# Patient Record
Sex: MALE | Race: OTHER | Employment: FULL TIME | URBAN - METROPOLITAN AREA
[De-identification: names, ages, dates, MRNs, and addresses within clinical notes are randomized per-mention and may not be internally consistent; named-entity substitution may affect disease eponyms.]

---

## 2017-04-28 ENCOUNTER — HOSPITAL ENCOUNTER (EMERGENCY)
Age: 37
Discharge: HOME | End: 2017-04-28
Attending: EMERGENCY MEDICINE
Payer: SUBSIDIZED

## 2017-04-28 VITALS
HEART RATE: 73 BPM | SYSTOLIC BLOOD PRESSURE: 121 MMHG | DIASTOLIC BLOOD PRESSURE: 67 MMHG | RESPIRATION RATE: 16 BRPM | HEIGHT: 70 IN | OXYGEN SATURATION: 97 % | WEIGHT: 212 LBS | BODY MASS INDEX: 30.35 KG/M2 | TEMPERATURE: 97.5 F

## 2017-04-28 DIAGNOSIS — H01.001 BLEPHARITIS OF RIGHT UPPER EYELID, UNSPECIFIED TYPE: Primary | ICD-10-CM

## 2017-04-28 PROCEDURE — 99283 EMERGENCY DEPT VISIT LOW MDM: CPT | Performed by: PHYSICIAN ASSISTANT

## 2017-04-28 RX ORDER — CEPHALEXIN 500 MG/1
500 CAPSULE ORAL 4 TIMES DAILY
Qty: 28 CAP | Refills: 0 | Status: SHIPPED | OUTPATIENT
Start: 2017-04-28 | End: 2017-05-05

## 2017-04-28 NOTE — ED TRIAGE NOTES
Pt c/o eye pain to the right eye x2 days. Pt denies trauma/injury and no drainage, crust or tearing.

## 2017-04-28 NOTE — ED NOTES
I have reviewed discharge instructions with the patient. The patient verbalized understanding. Patient is ambulatory from facility in no acute distress. Patient has discharge instructions and prescription x 1 in hand at time of departure.

## 2017-04-28 NOTE — ED TRIAGE NOTES
Pt has been using ciprofloxacin drops prescribed to his daughter x2 days to help alleviate symptoms. No relief.

## 2017-04-28 NOTE — DISCHARGE INSTRUCTIONS
Blepharitis: Care Instructions  Your Care Instructions    Blepharitis is an inflammation or infection of the eyelids. It causes dry, scaly crusts on the eyelids. It can also cause your eyes to itch, burn, and look red. This problem is more common in people who have rosacea, dandruff, skin allergies, or eczema. Home treatment can help you keep your eyes comfortable. Your doctor may also prescribe an ointment to put on your eyelids. Follow-up care is a key part of your treatment and safety. Be sure to make and go to all appointments, and call your doctor if you are having problems. It's also a good idea to know your test results and keep a list of the medicines you take. How can you care for yourself at home? · Wash your eyelids and eyebrows daily with baby shampoo. To wash your eyelids:  ¨ Place a very warm washcloth over your eyes for about a minute. This will help soften and loosen the crusts on your eyelashes. ¨ Put a few drops of baby shampoo on a warm washcloth. ¨ Gently wipe your eyelids. This helps remove any crust. It also cleans your eyelids. ¨ Rinse well with water. · Be safe with medicines. If your doctor prescribed medicine for you, use it exactly as directed. Call your doctor if you think you are having a problem with your medicine. When should you call for help? Call your doctor now or seek immediate medical care if:  · You have new vision problems. · Your eyes hurt. · Your eyelids bleed. Watch closely for changes in your health, and be sure to contact your doctor if:  · After 2 weeks of home treatment, your symptoms are not getting better. · Your eye turns red, gets very teary, or has discharge. Where can you learn more? Go to http://ghulam-karma.info/. Enter W102 in the search box to learn more about \"Blepharitis: Care Instructions. \"  Current as of: May 23, 2016  Content Version: 11.2  © 1100-9527 [employer] ([employer].  Care instructions adapted under license by [address] (which disclaims liability or warranty for this information). If you have questions about a medical condition or this instruction, always ask your healthcare professional. [address] any warranty or liability for your use of this information.

## 2017-04-28 NOTE — ED PROVIDER NOTES
Patient is a 39 y.o. male presenting with eye pain. The history is provided by the patient. Eye Pain    This is a new problem. The current episode started more than 2 days ago. The problem occurs constantly. The problem has been gradually worsening. The right eye is affected. The injury mechanism was none. The pain is at a severity of 6/10. The pain is mild. There is no history of trauma to the eye. There is no known exposure to pink eye. He does not wear contacts. Associated symptoms include pain. Pertinent negatives include no decreased vision, no itching and no blindness. Associated symptoms comments: Lid swelling . Treatments tried: cipro drops  The treatment provided no relief. History reviewed. No pertinent past medical history. Past Surgical History:   Procedure Laterality Date    HX ORTHOPAEDIC      left thumb surgery         History reviewed. No pertinent family history. Social History     Social History    Marital status: SINGLE     Spouse name: N/A    Number of children: N/A    Years of education: N/A     Occupational History    Not on file. Social History Main Topics    Smoking status: Never Smoker    Smokeless tobacco: Not on file    Alcohol use Yes      Comment: 1x/week maybe    Drug use: No    Sexual activity: Not on file     Other Topics Concern    Not on file     Social History Narrative    No narrative on file         ALLERGIES: Review of patient's allergies indicates no known allergies. Review of Systems   Eyes: Positive for pain. Negative for blindness. Skin: Negative for itching. All other systems reviewed and are negative. Vitals:    04/28/17 0929   BP: 121/67   Pulse: 73   Resp: 16   Temp: 97.5 °F (36.4 °C)   SpO2: 97%   Weight: 96.2 kg (212 lb)   Height: 5' 10\" (1.778 m)            Physical Exam   Constitutional: He is oriented to person, place, and time. He appears well-developed and well-nourished. No distress.    HENT:   Head: Normocephalic and atraumatic. Eyes: Conjunctivae and EOM are normal. Pupils are equal, round, and reactive to light. Rt upper eye lid with redness and swelling, no drianage, sclera clear    Neck: Normal range of motion. Neck supple. Cardiovascular: Normal rate and regular rhythm. Pulmonary/Chest: Effort normal and breath sounds normal. No respiratory distress. He has no wheezes. Abdominal: Soft. Bowel sounds are normal.   Musculoskeletal: He exhibits no edema. Neurological: He is alert and oriented to person, place, and time. Skin: Skin is warm. Nursing note and vitals reviewed.        MDM  Number of Diagnoses or Management Options  Diagnosis management comments: Blepharitis, stressed warm compresses, placed on keflex        Amount and/or Complexity of Data Reviewed  Review and summarize past medical records: yes    Risk of Complications, Morbidity, and/or Mortality  Presenting problems: low  Diagnostic procedures: low  Management options: low    Patient Progress  Patient progress: improved    ED Course       Procedures

## 2022-01-06 ENCOUNTER — APPOINTMENT (OUTPATIENT)
Dept: GENERAL RADIOLOGY | Age: 42
End: 2022-01-06
Attending: EMERGENCY MEDICINE
Payer: COMMERCIAL

## 2022-01-06 ENCOUNTER — HOSPITAL ENCOUNTER (EMERGENCY)
Age: 42
Discharge: HOME | End: 2022-01-06
Attending: EMERGENCY MEDICINE
Payer: COMMERCIAL

## 2022-01-06 VITALS
HEIGHT: 70 IN | RESPIRATION RATE: 16 BRPM | HEART RATE: 73 BPM | BODY MASS INDEX: 30.78 KG/M2 | WEIGHT: 215 LBS | DIASTOLIC BLOOD PRESSURE: 78 MMHG | SYSTOLIC BLOOD PRESSURE: 119 MMHG | OXYGEN SATURATION: 97 % | TEMPERATURE: 97.9 F

## 2022-01-06 DIAGNOSIS — S83.92XA SPRAIN OF LEFT KNEE, UNSPECIFIED LIGAMENT, INITIAL ENCOUNTER: Primary | ICD-10-CM

## 2022-01-06 PROCEDURE — 99283 EMERGENCY DEPT VISIT LOW MDM: CPT

## 2022-01-06 PROCEDURE — 73562 X-RAY EXAM OF KNEE 3: CPT

## 2022-01-06 PROCEDURE — 73590 X-RAY EXAM OF LOWER LEG: CPT

## 2022-01-06 RX ORDER — IBUPROFEN 800 MG/1
800 TABLET ORAL
Qty: 20 TABLET | Refills: 0 | Status: SHIPPED | OUTPATIENT
Start: 2022-01-06 | End: 2022-01-13

## 2022-01-06 NOTE — Clinical Note
[address] [address] EMERGENCY DEPT  [address] [address]  [phone]    Work/School Note    Date: 1/6/2022    To Whom It May concern:      Sreedhar Crews was seen and treated today in the emergency room by the following provider(s):  Attending Provider: Elton Huang MD  Physician Assistant: Mara Waite, [address] [place], [place] [address]annemarie Smith. Sreedhar Crews is excused from work/school on 01/06/22. He is clear to return to work/school on 01/07/22.         Sincerely,          Candie Gomez RN denies pain/discomfort

## 2022-01-06 NOTE — ED TRIAGE NOTES
Patient is a  and his foot went between back of truck and dock falling complaining of pain to left knee. Patient with abrasion to front of left lower leg. Denies any further pain. Masked.

## 2022-01-06 NOTE — DISCHARGE INSTRUCTIONS
Your x-ray today showed no fracture. You likely sprained one of the ligaments in your knee. Wear the knee immobilizer when ambulating over the next 2 to 3 days to help with pain and swelling. Elevate the leg when resting. Use ice to help with swelling. Take ibuprofen as prescribed for inflammation and pain control. Follow-up with your primary care doctor or call [address], number provided above, if no improvement in your symptoms over the next week. Return to the ER if you develop any new or worsening symptoms.

## 2022-01-06 NOTE — ED PROVIDER NOTES
40-year-old well-appearing male presents today for evaluation of left knee and anterior shin injury that occurred last night while at work. 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 He states that he had a twisting motion when he fell and has had pain in the medial aspect of the left knee ever since. Reports mild associated swelling. He also reports a very superficial abrasion of the left anterior shin. No numbness or tingling distally. No hip pain. He is able to ambulate without assistance. He denies any previous injury or surgery to this left knee. He states that his pain has progressively worsened overnight which is what prompted his visit to the department today. No past medical history on file. Past Surgical History:   Procedure Laterality Date    HX ORTHOPAEDIC      left thumb surgery         No family history on file. Social History     Socioeconomic History    Marital status: SINGLE     Spouse name: Not on file    Number of children: Not on file    Years of education: Not on file    Highest education level: Not on file   Occupational History    Not on file   Tobacco Use    Smoking status: Never Smoker    Smokeless tobacco: Not on file   Substance and Sexual Activity    Alcohol use: Yes     Comment: 1x/week maybe    Drug use: No    Sexual activity: Not on file   Other Topics Concern    Not on file   Social History Narrative    Not on file     Social Determinants of Health     Financial Resource Strain:     Difficulty of Paying Living Expenses: Not on file   Food Insecurity:     Worried About Running Out of Food in the Last Year: Not on file    Latricia of Food in the Last Year: Not on file   Transportation Needs:     Lack of Transportation (Medical): Not on file    Lack of Transportation (Non-Medical):  Not on file   Physical Activity:     Days of Exercise per Week: Not on file    Minutes of Exercise per Session: Not on file   Stress:     Feeling of Stress : Not on file   Social Connections:     Frequency of Communication with Friends and Family: Not on file    Frequency of Social Gatherings with Friends and Family: Not on file    Attends Church Services: Not on file    Active Member of Clubs or Organizations: Not on file    Attends Club or Organization Meetings: Not on file    Marital Status: Not on file   Intimate Partner Violence:     Fear of Current or Ex-Partner: Not on file    Emotionally Abused: Not on file    Physically Abused: Not on file    Sexually Abused: Not on file   Housing Stability:     Unable to Pay for Housing in the Last Year: Not on file    Number of [place] in the Last Year: Not on file    Unstable Housing in the Last Year: Not on file         ALLERGIES: Patient has no known allergies. Review of Systems   Musculoskeletal: Positive for joint swelling. Negative for gait problem. Left knee pain   Skin: Positive for wound. Abrasion of left shin   Neurological: Negative for weakness. No paresthesias. Denies head injury. Hematological: Does not bruise/bleed easily. Vitals:    01/06/22 0832   BP: 130/84   Pulse: 91   Resp: 16   Temp: 97.9 °F (36.6 °C)   SpO2: 95%   Weight: 97.5 kg (215 lb)   Height: 5' 10\" (1.778 m)            Physical Exam  Vitals and nursing note reviewed. Constitutional:       General: He is not in acute distress. Appearance: Normal appearance. HENT:      Head: Normocephalic and atraumatic. Musculoskeletal:      Left knee: Swelling, bony tenderness and crepitus present. No deformity, effusion, erythema, ecchymosis or lacerations. Normal range of motion. No LCL laxity, MCL laxity, ACL laxity or PCL laxity. Instability Tests: Medial Essence test negative and lateral Essence test negative. Legs:    Skin:     General: Skin is warm and dry.       Capillary Refill: Capillary refill takes less than 2 seconds. Neurological:      General: No focal deficit present. Mental Status: He is alert and oriented to person, place, and time. Psychiatric:         Mood and Affect: Mood normal.         Speech: Speech normal.         Behavior: Behavior normal.          MDM  Number of Diagnoses or Management Options  Sprain of left knee, unspecified ligament, initial encounter  Diagnosis management comments: DDX: Fracture, sprain, strain, ligament tear, effusion, dislocation, laceration, compartment syndrome    In summary this is a well-appearing 62-year-old male presenting today for left knee twisting injury while at work last night. On physical exam, there is very mild swelling and tenderness to palpation over the medial aspect of the left knee. There is no erythema, warmth, or ecchymosis. Is a very mild superficial abrasion of the anterior shin with mild surrounding erythema and swelling. He is neurovascularly intact distally, compartments are soft. X-rays are negative for acute fracture. Patient will be treated outpatient with knee immobilizer, anti-inflammatories, and educated on RICE. He will follow up with PCP or with Ortho if no improvement in symptoms over the next 1 week. He was educated on signs symptoms that require emergent reevaluation and he verbalizes understanding and agrees with this plan. Kareen Oliver; 1/6/2022 @9:56 AM Voice dictation software was used during the making of this note. This software is not perfect and grammatical and other typographical errors may be present.   This note has not been proofread for errors.  ====================================         Amount and/or Complexity of Data Reviewed  Tests in the radiology section of CPT®: ordered and reviewed    Patient Progress  Patient progress: stable         Procedures

## 2022-01-06 NOTE — ED NOTES
I have reviewed discharge instructions with the patient. The patient verbalized understanding. Patient left ED via Discharge Method: ambulatory to Home with self. Opportunity for questions and clarification provided. Patient given 1 scripts. To continue your aftercare when you leave the hospital, you may receive an automated call from our care team to check in on how you are doing. This is a free service and part of our promise to provide the best care and service to meet your aftercare needs.  If you have questions, or wish to unsubscribe from this service please call [phone]. Thank you for Choosing our [facility] Emergency Department.

## 2022-01-06 NOTE — Clinical Note
[address] [address] EMERGENCY DEPT  [address] [facility] [address]  [phone]    Work/School Note    Date: 1/6/2022    To Whom It May concern:      Alberto Guan was seen and treated today in the emergency room by the following provider(s):  Attending Provider: Percy Dolan MD  Physician Assistant: Kareen Lei. Alberto Guan is excused from work/school on 01/06/22. He is clear to return to work/school on 01/07/22.         Sincerely,          Ebonie Mckeonma

## 2022-08-19 ENCOUNTER — HOSPITAL ENCOUNTER (EMERGENCY)
Age: 42
Discharge: HOME | End: 2022-08-19
Attending: EMERGENCY MEDICINE
Payer: COMMERCIAL

## 2022-08-19 VITALS
TEMPERATURE: 98.7 F | RESPIRATION RATE: 19 BRPM | OXYGEN SATURATION: 99 % | HEART RATE: 71 BPM | BODY MASS INDEX: 33.64 KG/M2 | SYSTOLIC BLOOD PRESSURE: 119 MMHG | DIASTOLIC BLOOD PRESSURE: 79 MMHG | WEIGHT: 235 LBS | HEIGHT: 70 IN

## 2022-08-19 DIAGNOSIS — U07.1 COVID-19: Primary | ICD-10-CM

## 2022-08-19 LAB
ALBUMIN SERPL-MCNC: 3.6 G/DL (ref 3.5–5)
ALBUMIN/GLOB SERPL: 1 {RATIO} (ref 1.2–3.5)
ALP SERPL-CCNC: 76 U/L (ref 50–136)
ALT SERPL-CCNC: 151 U/L (ref 12–65)
ANION GAP SERPL CALC-SCNC: 2 MMOL/L (ref 7–16)
AST SERPL-CCNC: 246 U/L (ref 15–37)
BASOPHILS # BLD: 0 K/UL (ref 0–0.2)
BASOPHILS NFR BLD: 0 % (ref 0–2)
BILIRUB SERPL-MCNC: 0.3 MG/DL (ref 0.2–1.1)
BUN SERPL-MCNC: 14 MG/DL (ref 6–23)
CALCIUM SERPL-MCNC: 8.3 MG/DL (ref 8.3–10.4)
CHLORIDE SERPL-SCNC: 103 MMOL/L (ref 98–107)
CO2 SERPL-SCNC: 32 MMOL/L (ref 21–32)
CREAT SERPL-MCNC: 1.2 MG/DL (ref 0.8–1.5)
DIFFERENTIAL METHOD BLD: ABNORMAL
EOSINOPHIL # BLD: 0 K/UL (ref 0–0.8)
EOSINOPHIL NFR BLD: 0 % (ref 0.5–7.8)
ERYTHROCYTE [DISTWIDTH] IN BLOOD BY AUTOMATED COUNT: 13.8 % (ref 11.9–14.6)
GLOBULIN SER CALC-MCNC: 3.7 G/DL (ref 2.3–3.5)
GLUCOSE SERPL-MCNC: 108 MG/DL (ref 65–100)
HCT VFR BLD AUTO: 43.5 % (ref 41.1–50.3)
HGB BLD-MCNC: 14.4 G/DL (ref 13.6–17.2)
IMM GRANULOCYTES # BLD AUTO: 0 K/UL (ref 0–0.5)
IMM GRANULOCYTES NFR BLD AUTO: 0 % (ref 0–5)
LYMPHOCYTES # BLD: 0.7 K/UL (ref 0.5–4.6)
LYMPHOCYTES NFR BLD: 22 % (ref 13–44)
MAGNESIUM SERPL-MCNC: 2.1 MG/DL (ref 1.8–2.4)
MCH RBC QN AUTO: 28.3 PG (ref 26.1–32.9)
MCHC RBC AUTO-ENTMCNC: 33.1 G/DL (ref 31.4–35)
MCV RBC AUTO: 85.6 FL (ref 79.6–97.8)
MONOCYTES # BLD: 0.4 K/UL (ref 0.1–1.3)
MONOCYTES NFR BLD: 13 % (ref 4–12)
NEUTS SEG # BLD: 2 K/UL (ref 1.7–8.2)
NEUTS SEG NFR BLD: 65 % (ref 43–78)
NRBC # BLD: 0 K/UL (ref 0–0.2)
PLATELET # BLD AUTO: 202 K/UL (ref 150–450)
PMV BLD AUTO: 9.9 FL (ref 9.4–12.3)
POTASSIUM SERPL-SCNC: 3.6 MMOL/L (ref 3.5–5.1)
PROT SERPL-MCNC: 7.3 G/DL (ref 6.3–8.2)
RBC # BLD AUTO: 5.08 M/UL (ref 4.23–5.6)
SARS-COV-2 RDRP RESP QL NAA+PROBE: DETECTED
SODIUM SERPL-SCNC: 137 MMOL/L (ref 136–145)
SOURCE: ABNORMAL
WBC # BLD AUTO: 3.1 K/UL (ref 4.3–11.1)

## 2022-08-19 PROCEDURE — 6360000002 HC RX W HCPCS: Performed by: PHYSICIAN ASSISTANT

## 2022-08-19 PROCEDURE — 83735 ASSAY OF MAGNESIUM: CPT

## 2022-08-19 PROCEDURE — 99284 EMERGENCY DEPT VISIT MOD MDM: CPT

## 2022-08-19 PROCEDURE — 80053 COMPREHEN METABOLIC PANEL: CPT

## 2022-08-19 PROCEDURE — 87635 SARS-COV-2 COVID-19 AMP PRB: CPT

## 2022-08-19 PROCEDURE — 85025 COMPLETE CBC W/AUTO DIFF WBC: CPT

## 2022-08-19 PROCEDURE — 96374 THER/PROPH/DIAG INJ IV PUSH: CPT

## 2022-08-19 RX ORDER — KETOROLAC TROMETHAMINE 30 MG/ML
30 INJECTION, SOLUTION INTRAMUSCULAR; INTRAVENOUS
Status: COMPLETED | OUTPATIENT
Start: 2022-08-19 | End: 2022-08-19

## 2022-08-19 RX ADMIN — KETOROLAC TROMETHAMINE 30 MG: 30 INJECTION, SOLUTION INTRAMUSCULAR at 18:02

## 2022-08-19 ASSESSMENT — ENCOUNTER SYMPTOMS
COUGH: 0
SORE THROAT: 0
DIARRHEA: 1
VOMITING: 1
ABDOMINAL PAIN: 0
SHORTNESS OF BREATH: 0
NAUSEA: 1
EYE REDNESS: 0
BACK PAIN: 1

## 2022-08-19 ASSESSMENT — PAIN SCALES - GENERAL
PAINLEVEL_OUTOF10: 5
PAINLEVEL_OUTOF10: 8

## 2022-08-19 ASSESSMENT — PAIN - FUNCTIONAL ASSESSMENT: PAIN_FUNCTIONAL_ASSESSMENT: 0-10

## 2022-08-19 NOTE — Clinical Note
Marisela Ortiz was seen and treated in our emergency department on 8/19/2022. COVID19 virus is suspected. Per the CDC guidelines we recommend home isolation until the following conditions are all met:    1. At least five days have passed since symptoms first appeared and/or had a close exposure,   2. After home isolation for five days, wearing a mask around others for the next five days,  3. At least 24 have passed since last fever without the use of fever-reducing medications and  4. Symptoms (eg cough, shortness of breath) have improved    If you have any questions or concerns, please don't hesitate to call.     He may return to work/school on 08/22/2022        Cathy Hinojosa

## 2022-08-19 NOTE — ED TRIAGE NOTES
Patient reports lower back pain, dizziness, nausea, and feeling hot after starting new job where he feels like he is \"overheating. \"

## 2022-08-19 NOTE — ED NOTES
I have reviewed discharge instructions with the patient. The patient verbalized understanding. Patient left ED via Discharge Method: ambulatory to Home with self. Opportunity for questions and clarification provided. Patient given 0 scripts. To continue your aftercare when you leave the hospital, you may receive an automated call from our care team to check in on how you are doing. This is a free service and part of our promise to provide the best care and service to meet your aftercare needs.  If you have questions, or wish to unsubscribe from this service please call [phone]. Thank you for Choosing our [facility] Emergency Department.         Emmy Benavidez RN  08/19/22 1800

## 2022-08-19 NOTE — DISCHARGE INSTRUCTIONS
We would love to help you get a primary care doctor for follow-up after your emergency department visit. Please call [phone] between 7AM - 6PM Monday to Friday. A care navigator will be able to assist you with setting up a doctor close to your home.

## 2022-08-22 ENCOUNTER — CARE COORDINATION (OUTPATIENT)
Dept: CARE COORDINATION | Facility: CLINIC | Age: 42
End: 2022-08-22

## 2022-08-22 NOTE — CARE COORDINATION
Date/Time:  8/22/2022 11:47 AM  Attempted to reach patient by telephone. Call within 2 business days of discharge: Yes Unable to reach patient. Will attempt to reach patient again.

## 2022-08-23 ENCOUNTER — CARE COORDINATION (OUTPATIENT)
Dept: CARE COORDINATION | Facility: CLINIC | Age: 42
End: 2022-08-23

## 2022-08-23 NOTE — CARE COORDINATION
Date/Time:  8/23/2022 10:56 AM  Attempted to reach patient by telephone. Call within 2 business days of discharge: Yes Left HIPPA compliant message requesting a return call. Unable to reach patient x 2 attempts. Episode closed.

## 2023-01-26 NOTE — ED PROVIDER NOTES
[facility] Emergency Department Provider Note                   PCP:                No primary care provider on file. Age: 42 y.o. Sex: male       ICD-10-CM    1. COVID-19  U5.3           DISPOSITION Decision To Discharge 08/19/2022 05:50:16 PM        MDM  Number of Diagnoses or Management Options  COVID-19  Diagnosis management comments: Patient is a 76-year-old male who presents with complaint of body aches, chills, nausea vomiting diarrhea and fatigue all ongoing for the last 3 days. Nausea vomiting diarrhea have since subsided and patient was able to drink a liter bottle of Pedialyte without issue. He is afebrile, nontoxic in appearance, vital signs within appropriate limits. Given patient's symptoms, suspect viral illness. Will check for COVID-19. Labs Reviewed  COVID-19, RAPID - Abnormal; Notable for the following components:     SARS-CoV-2, Rapid             Detected (*)            All other components within normal limits  CBC WITH AUTO DIFFERENTIAL - Abnormal; Notable for the following components:     WBC                           3.1 (*)                Monocytes                     13 (*)                 Eosinophils %                 0 (*)               All other components within normal limits  COMPREHENSIVE METABOLIC PANEL - Abnormal; Notable for the following components:     Anion Gap                     2 (*)                  Glucose                       108 (*)                ALT                           151 (*)                AST                           246 (*)                Globulin                      3.7 (*)                Albumin/Globulin Ratio        1.0 (*)             All other components within normal limits  MAGNESIUM    Rapid COVID positive. All results discussed with patient at bedside. Advised that he continue supportive care as he has been doing and continue to self isolate per current CDC guidelines. Discussed reasons to return to the ER.   All agreeable to plan. Patient provided with note for work. Orders Placed This Encounter   Procedures    COVID-19, Rapid    CBC with Auto Differential    Comprehensive Metabolic Panel    Magnesium    POCT Urine Dipstick        Marisela Ortiz is a 42 y.o. male who presents to the Emergency Department with chief complaint of    Chief Complaint   Patient presents with    Dehydration     Patient reports lower back pain, dizziness, nausea, and feeling hot after starting new job where he feels like he is \"overheating. \"       Patient is a 41-year-old male who presents with complaint of nausea vomiting, diarrhea, body aches and fatigue ongoing for the last 3 days. 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 He states that the body aches are in his lower back and legs. He had nausea and vomiting yesterday but that has since resolved. He also had a few bouts of diarrhea but that again has since resolved. He denies any recent sick contacts. He denies any cough, chest pain, shortness of breath. No abdominal pain. The history is provided by the patient. Review of Systems   Constitutional:  Positive for chills and fatigue. Negative for activity change, appetite change and fever. HENT:  Negative for congestion and sore throat. Eyes:  Negative for redness. Respiratory:  Negative for cough and shortness of breath. Cardiovascular:  Negative for chest pain and leg swelling. Gastrointestinal:  Positive for diarrhea, nausea and vomiting. Negative for abdominal pain. Genitourinary:  Negative for hematuria. Musculoskeletal:  Positive for back pain and myalgias. Negative for neck pain. Skin:  Negative for rash. Neurological:  Positive for dizziness. Negative for weakness, light-headedness and headaches. Psychiatric/Behavioral:  Negative for confusion. No past medical history on file.      Past Surgical History:   Procedure Laterality Date    ORTHOPEDIC SURGERY      left thumb surgery        No family history on file. Social History     Socioeconomic History    Marital status: Single   Tobacco Use    Smoking status: Never   Substance and Sexual Activity    Alcohol use: Yes    Drug use: No         Patient has no known allergies. Previous Medications    No medications on file        Vitals signs and nursing note reviewed. Patient Vitals for the past 4 hrs:   Temp Pulse Resp BP SpO2   08/19/22 1539 98.7 °F (37.1 °C) 73 20 127/83 98 %          Physical Exam  Vitals and nursing note reviewed. Constitutional:       General: He is not in acute distress. Appearance: He is not ill-appearing or toxic-appearing. HENT:      Head: Normocephalic and atraumatic. Cardiovascular:      Rate and Rhythm: Normal rate. Pulses: Normal pulses. Pulmonary:      Effort: Pulmonary effort is normal.      Breath sounds: Normal breath sounds. Abdominal:      General: There is no distension. Palpations: Abdomen is soft. Tenderness: There is no abdominal tenderness. Musculoskeletal:      Cervical back: Normal range of motion. No tenderness. Skin:     General: Skin is warm and dry. Neurological:      Mental Status: He is alert and oriented to person, place, and time.    Psychiatric:         Mood and Affect: Mood normal.         Behavior: Behavior normal.        Procedures      Labs Reviewed   COVID-19, RAPID - Abnormal; Notable for the following components:       Result Value    SARS-CoV-2, Rapid Detected (*)     All other components within normal limits   CBC WITH AUTO DIFFERENTIAL - Abnormal; Notable for the following components:    WBC 3.1 (*)     Monocytes 13 (*)     Eosinophils % 0 (*)     All other components within normal limits   COMPREHENSIVE METABOLIC PANEL - Abnormal; Notable for the following components:    Anion Gap 2 (*)     Glucose 108 (*)      (*)      (*) Globulin 3.7 (*)     Albumin/Globulin Ratio 1.0 (*)     All other components within normal limits   MAGNESIUM        No orders to display                          Voice dictation software was used during the making of this note. This software is not perfect and grammatical and other typographical errors may be present. This note has not been completely proofread for errors.      [place], [place]  08/19/22 6249 Yes